# Patient Record
Sex: FEMALE | Race: WHITE
[De-identification: names, ages, dates, MRNs, and addresses within clinical notes are randomized per-mention and may not be internally consistent; named-entity substitution may affect disease eponyms.]

---

## 2022-07-28 ENCOUNTER — HOSPITAL ENCOUNTER (OUTPATIENT)
Dept: HOSPITAL 47 - RADMRIMAIN | Age: 32
Discharge: HOME | End: 2022-07-28
Attending: FAMILY MEDICINE
Payer: COMMERCIAL

## 2022-07-28 DIAGNOSIS — H81.10: Primary | ICD-10-CM

## 2022-07-28 PROCEDURE — 70551 MRI BRAIN STEM W/O DYE: CPT

## 2022-07-29 NOTE — MR
EXAMINATION TYPE: MR brain wo con

 

DATE OF EXAM: 7/28/2022

 

COMPARISON: None

 

HISTORY: Dizziness, ringing and pain in ears.

 

Multiplanar multi echo imaging of the brain with no contrast.

 

Ventricles and sulci appear normal. There is no mass effect or midline shift. No sign of intracranial
 hemorrhage. Diffusion images show no sign of an acute infarct. The gray and white matter structures 
have fairly normal signal pattern. No evidence of cerebral edema. No evidence of retro-orbital mass. 
Corpus callosum appears normal. Sella turcica is normal. Brainstem is intact.

 

No evidence of posterior fossa mass. Mastoid sinuses show no evidence of inflammation. No evidence of
 cerebellopontine angle mass.

 

IMPRESSION:

Negative MR scan of the brain.

## 2023-09-07 ENCOUNTER — HOSPITAL ENCOUNTER (EMERGENCY)
Dept: HOSPITAL 47 - EC | Age: 33
Discharge: HOME | End: 2023-09-07
Payer: COMMERCIAL

## 2023-09-07 VITALS — DIASTOLIC BLOOD PRESSURE: 86 MMHG | SYSTOLIC BLOOD PRESSURE: 108 MMHG

## 2023-09-07 VITALS — HEART RATE: 100 BPM

## 2023-09-07 VITALS — TEMPERATURE: 98 F

## 2023-09-07 VITALS — RESPIRATION RATE: 18 BRPM

## 2023-09-07 DIAGNOSIS — R07.89: Primary | ICD-10-CM

## 2023-09-07 DIAGNOSIS — Z20.822: ICD-10-CM

## 2023-09-07 DIAGNOSIS — R06.02: ICD-10-CM

## 2023-09-07 DIAGNOSIS — R00.0: ICD-10-CM

## 2023-09-07 DIAGNOSIS — Z86.59: ICD-10-CM

## 2023-09-07 LAB
ALBUMIN SERPL-MCNC: 4.8 G/DL (ref 3.5–5)
ALP SERPL-CCNC: 72 U/L (ref 38–126)
ALT SERPL-CCNC: 37 U/L (ref 4–34)
ANION GAP SERPL CALC-SCNC: 8 MMOL/L
APTT BLD: 23.8 SEC (ref 22–30)
AST SERPL-CCNC: 32 U/L (ref 14–36)
BASOPHILS # BLD AUTO: 0 K/UL (ref 0–0.2)
BASOPHILS NFR BLD AUTO: 0 %
BUN SERPL-SCNC: 13 MG/DL (ref 7–17)
CALCIUM SPEC-MCNC: 9.8 MG/DL (ref 8.4–10.2)
CHLORIDE SERPL-SCNC: 106 MMOL/L (ref 98–107)
CO2 SERPL-SCNC: 24 MMOL/L (ref 22–30)
EOSINOPHIL # BLD AUTO: 0.1 K/UL (ref 0–0.7)
EOSINOPHIL NFR BLD AUTO: 1 %
ERYTHROCYTE [DISTWIDTH] IN BLOOD BY AUTOMATED COUNT: 4.24 M/UL (ref 3.8–5.4)
ERYTHROCYTE [DISTWIDTH] IN BLOOD: 11.7 % (ref 11.5–15.5)
GLUCOSE SERPL-MCNC: 90 MG/DL (ref 74–99)
HCT VFR BLD AUTO: 37.8 % (ref 34–46)
HGB BLD-MCNC: 13.3 GM/DL (ref 11.4–16)
INR PPP: 0.9 (ref ?–1.2)
LYMPHOCYTES # SPEC AUTO: 1.5 K/UL (ref 1–4.8)
LYMPHOCYTES NFR SPEC AUTO: 16 %
MAGNESIUM SPEC-SCNC: 2.2 MG/DL (ref 1.6–2.3)
MCH RBC QN AUTO: 31.3 PG (ref 25–35)
MCHC RBC AUTO-ENTMCNC: 35.1 G/DL (ref 31–37)
MCV RBC AUTO: 89.2 FL (ref 80–100)
MONOCYTES # BLD AUTO: 0.5 K/UL (ref 0–1)
MONOCYTES NFR BLD AUTO: 6 %
NEUTROPHILS # BLD AUTO: 6.8 K/UL (ref 1.3–7.7)
NEUTROPHILS NFR BLD AUTO: 75 %
PLATELET # BLD AUTO: 224 K/UL (ref 150–450)
POTASSIUM SERPL-SCNC: 4.1 MMOL/L (ref 3.5–5.1)
PROT SERPL-MCNC: 7.5 G/DL (ref 6.3–8.2)
PT BLD: 9.9 SEC (ref 9–12)
SODIUM SERPL-SCNC: 138 MMOL/L (ref 137–145)
WBC # BLD AUTO: 9 K/UL (ref 3.8–10.6)

## 2023-09-07 PROCEDURE — 83735 ASSAY OF MAGNESIUM: CPT

## 2023-09-07 PROCEDURE — 36415 COLL VENOUS BLD VENIPUNCTURE: CPT

## 2023-09-07 PROCEDURE — 96375 TX/PRO/DX INJ NEW DRUG ADDON: CPT

## 2023-09-07 PROCEDURE — 99285 EMERGENCY DEPT VISIT HI MDM: CPT

## 2023-09-07 PROCEDURE — 93005 ELECTROCARDIOGRAM TRACING: CPT

## 2023-09-07 PROCEDURE — 84484 ASSAY OF TROPONIN QUANT: CPT

## 2023-09-07 PROCEDURE — 85610 PROTHROMBIN TIME: CPT

## 2023-09-07 PROCEDURE — 96374 THER/PROPH/DIAG INJ IV PUSH: CPT

## 2023-09-07 PROCEDURE — 87636 SARSCOV2 & INF A&B AMP PRB: CPT

## 2023-09-07 PROCEDURE — 94640 AIRWAY INHALATION TREATMENT: CPT

## 2023-09-07 PROCEDURE — 80053 COMPREHEN METABOLIC PANEL: CPT

## 2023-09-07 PROCEDURE — 85025 COMPLETE CBC W/AUTO DIFF WBC: CPT

## 2023-09-07 PROCEDURE — 85730 THROMBOPLASTIN TIME PARTIAL: CPT

## 2023-09-07 PROCEDURE — 85379 FIBRIN DEGRADATION QUANT: CPT

## 2023-09-07 NOTE — ED
Chest Pain HPI





- General


Chief Complaint: Chest Pain


Stated Complaint: chest pain-sent by urgent care


Time Seen by Provider: 09/07/23 17:20


Source: patient, RN notes reviewed


Mode of arrival: ambulatory


Limitations: no limitations





- History of Present Illness


Initial Comments: 


This is a 33-year-old female who presents to the emergency department for chest 

pain.  Patient states that when she woke up this morning she developed pain on 

the right side of her chest that has wrapped around into the back.  Also reports

some pain going down the right arm.  States that taking a deep breath is also 

very painful and she feels like she is having difficulty taking a deep breath.  

Denies any history of similar symptoms in the past.  Also denies any history of 

asthma or other respiratory illnesses.  Patient is not a smoker.  She originally

went to urgent care, where she had an EKG and chest x-ray done.  States that 

these were unremarkable, but she was instructed to come to the emergency 

department to rule out a pulmonary embolus.





Denies any fevers, chills, sore throat, cough, palpitations, abdominal pain, 

nausea, vomiting, diarrhea, back pain, or headaches.





MD Complaint: chest pain





- Related Data


                                  Previous Rx's











 Medication  Instructions  Recorded


 


Amoxicillin/Potassium Clav 1 tab PO Q12HR #20 tab 05/01/22





[Augmentin 875-125 Tablet]  


 


Albuterol Sulfate [Albuterol 1 puff PO Q4-6H PRN #8.5 gm 09/07/23





Sulfate Hfa]  











                                    Allergies











Allergy/AdvReac Type Severity Reaction Status Date / Time


 


No Known Allergies Allergy   Verified 05/01/22 07:48














Review of Systems


ROS Statement: 


Those systems with pertinent positive or pertinent negative responses have been 

documented in the HPI.





ROS Other: All systems not noted in ROS Statement are negative.





Past Medical History


Past Medical History: No Reported History


History of Any Multi-Drug Resistant Organisms: None Reported


Past Surgical History: Hernia Repair


Past Psychological History: Anxiety


Smoking Status: Never smoker


Past Alcohol Use History: None Reported


Past Drug Use History: None Reported





General Exam


Limitations: no limitations


General appearance: alert, in no apparent distress


Head exam: Present: atraumatic, normocephalic, normal inspection


Respiratory exam: Present: normal lung sounds bilaterally.  Absent: respiratory 

distress, wheezes, rales, rhonchi, stridor


Cardiovascular Exam: Present: regular rate, normal rhythm, normal heart sounds. 

Absent: systolic murmur, diastolic murmur, rubs, gallop, clicks


Neurological exam: Present: alert, oriented X3, CN II-XII intact


Psychiatric exam: Present: normal affect, normal mood


Skin exam: Present: warm, dry, intact, normal color.  Absent: rash





Course


                                   Vital Signs











  09/07/23 09/07/23 09/07/23





  17:12 17:50 18:19


 


Temperature 98 F  


 


Pulse Rate 116 H 98 104 H


 


Respiratory 20 20 18





Rate   


 


Blood Pressure 125/84 108/86 


 


O2 Sat by Pulse 99 98 





Oximetry   














  09/07/23





  18:28


 


Temperature 


 


Pulse Rate 100


 


Respiratory 





Rate 


 


Blood Pressure 


 


O2 Sat by Pulse 





Oximetry 














Chest Pain MDM





- MDM


This is a 33-year-old female who presents to the emergency department for chest 

pain.





Was pt. sent in by a medical professional or institution?


@  -[facility]


Did you speak to anyone other than the patient for history?  


@  -No


Did you review nursing and triage notes? 


@  -Yes, and I agree, it is accurate with regards to the patient's symptoms.


Were old charts reviewed? 


@  -No


Differential Diagnosis? 


@  -Differential Chest Pain:


Stable Angina, Unstable Angina, STEMI, NSTEMI Aortic Dissection, Pneumothorax, 

Musculoskeletal, Esophageal Spasm GERD, Cholecystitis, Pancreatitis, Zoster, 

this is not meant to be an all-inclusive list. 


EKG interpreted by me (3pts min.)?


@  -EKG interpreted by me demonstrating the following: Sinus tachycardia.  

Ventricular rate 100 bpm, KS interval 147 ms, QRS duration 79 ms,  ms.


X-rays interpreted by me (1pt min.)?


@  -Not obtained


CT interpreted by me (1pt min.)?


@  -Not obtained


U/S interpreted by me (1pt. min.)?


@  -Not obtained


What testing was considered but not performed? (CT, X-rays, U/S, labs)? Why?


@  -None


What meds were considered but not given? Why?


@  -None


Did you discuss the management of the patient with other professionals?


@  -No


Did you reconcile home meds?


@  -No


Was smoking cessation discussed for >3mins.?


@  -No


Was critical care preformed (if so, how long)?


@  -No


Were there social determinants of health that impacted care today? How? (Homel

essness, low income, unemployed, alcoholism, drug addiction, transportation, low

edu. Level, literacy, decrease access to med. care, intermediate, rehab)?


@  -No


Was there de-escalation of care discussed even if they declined? (Discuss DNR or

withdrawal of care, Hospice)?


@  -No


What co-morbidities impacted this encounter? (DM, HTN, Smoking, COPD, CAD, 

Cancer, CVA, Hep., AIDS, mental health diagnosis, sleep apnea, morbid obesity)?


@  -None


Was patient admitted / discharged?


@  -Discharged.  Lab work obtained and found to be nonactionable, including a 

negative troponin and negative d-dimer.  Chest x-ray completed at urgent care 

was found to be normal and patient did not wish to repeat this.  She was given a

dose of Toradol and a DuoNeb breathing treatment.  States that the DuoNeb 

breathing treatment was beneficial.  Advised that the cause of her symptoms is 

not entirely clear at this time.  She was given a dose of Decadron and a 

prescription for an albuterol inhaler was provided with dosing instructions 

reviewed.  Otherwise advised close follow-up with her primary care provider.


Undiagnosed new problem with uncertain prognosis?


@  -None


Drug Therapy requiring intensive monitoring for toxicity (Heparin, Nitro, 

Insulin, Cardizem)?


@  -None


Were any procedures done?


@  -None


Diagnosis/symptom?


@  -Atypical chest pain, shortness of breath


Acute, or Chronic, or Acute on Chronic?


@  -Acute


Uncomplicated (without systemic symptoms) or Complicated (systemic symptoms)?


@  -Uncomplicated


Side effects of treatment?


@  -None


Exacerbation, Progression, or Severe Exacerbation]


@  -Not applicable


Poses a threat to life or bodily function?


@  -No





Return precautions reviewed in depth, the patient is instructed to return to the

emergency department with any new, worsening, or concerning symptoms. Patient 

verbalized understanding. 





This case was discussed in detail with the attending ED physician, Dr. Bernal. 

Presentation, findings, and treatment plan discussed in detail as well. 








Disposition


Clinical Impression: 


 Chest pain, Shortness of breath





Disposition: HOME SELF-CARE


Instructions (If sedation given, give patient instructions):  Chest Pain (ED), 

Noncardiac Chest Pain (ED)


Additional Instructions: 


Return to the emergency department with any new, worsening, or concerning 

symptoms.  Alternate with ibuprofen and Tylenol as needed for pain relief.  You 

can use the albuterol inhaler every 4-6 hours as needed for shortness of breath.

 Follow up with your primary care provider in 1-2 days.


Prescriptions: 


Albuterol Sulfate [Albuterol Sulfate Hfa] 1 puff PO Q4-6H PRN #8.5 gm


 PRN Reason: Shortness Of Breath


Is patient prescribed a controlled substance at d/c from ED?: No


Referrals: 


Jimmy Davis MD [Primary Care Provider] - 1-2 days